# Patient Record
Sex: MALE | Race: WHITE | URBAN - METROPOLITAN AREA
[De-identification: names, ages, dates, MRNs, and addresses within clinical notes are randomized per-mention and may not be internally consistent; named-entity substitution may affect disease eponyms.]

---

## 2017-12-06 ENCOUNTER — OFFICE VISIT (OUTPATIENT)
Dept: URGENT CARE | Facility: URGENT CARE | Age: 27
End: 2017-12-06

## 2017-12-06 VITALS
WEIGHT: 238.3 LBS | SYSTOLIC BLOOD PRESSURE: 128 MMHG | DIASTOLIC BLOOD PRESSURE: 75 MMHG | OXYGEN SATURATION: 97 % | TEMPERATURE: 97 F | HEART RATE: 74 BPM

## 2017-12-06 DIAGNOSIS — T16.2XXA FOREIGN BODY OF LEFT EAR, INITIAL ENCOUNTER: Primary | ICD-10-CM

## 2017-12-06 PROCEDURE — 69200 CLEAR OUTER EAR CANAL: CPT | Performed by: FAMILY MEDICINE

## 2017-12-06 NOTE — NURSING NOTE
Chief Complaint   Patient presents with     Ear Problem     Ear bud from ear phone stuck on lt ear x noticed today        Initial /75  Pulse 74  Temp 97  F (36.1  C) (Oral)  Wt 238 lb 4.8 oz (108.1 kg)  SpO2 97% There is no height or weight on file to calculate BMI.  Medication Reconciliation: complete

## 2017-12-06 NOTE — MR AVS SNAPSHOT
"              After Visit Summary   2017    Julio Phlilip    MRN: [id number]           Patient Information     Date Of Birth          1990        Visit Information        Provider Department      2017 2:40 PM Francois Orellana,  [facility]        Today's Diagnoses     Foreign body of left ear, initial encounter    -  1       Follow-ups after your visit        Who to contact     If you have questions or need follow up information about today's clinic visit or your schedule please contact [facility] directly at [phone].  Normal or non-critical lab and imaging results will be communicated to you by KIXEYEhart, letter or phone within 4 business days after the clinic has received the results. If you do not hear from us within 7 days, please contact the clinic through KIXEYEhart or phone. If you have a critical or abnormal lab result, we will notify you by phone as soon as possible.  Submit refill requests through MyQuoteApp or call your pharmacy and they will forward the refill request to us. Please allow 3 business days for your refill to be completed.          Additional Information About Your Visit        MyChart Information     MyQuoteApp lets you send messages to your doctor, view your test results, renew your prescriptions, schedule appointments and more. To sign up, go to [email or web address] . Click on \"Log in\" on the left side of the screen, which will take you to the Welcome page. Then click on \"Sign up Now\" on the right side of the page.     You will be asked to enter the access code listed below, as well as some personal information. Please follow the directions to create your username and password.     Your access code is: [email or web address]  Expires: 3/6/2018  3:36 PM     Your access code will  in 90 days. If you need help or a new code, please call your [place] clinic or [phone].        Care EveryWhere ID     This is your Care " EveryWhere ID. This could be used by other organizations to access your [place] medical records  [id number]        Your Vitals Were     Pulse Temperature Pulse Oximetry             74 97  F (36.1  C) (Oral) 97%          Blood Pressure from Last 3 Encounters:   12/06/17 128/75    Weight from Last 3 Encounters:   12/06/17 238 lb 4.8 oz (108.1 kg)              We Performed the Following     REMOVE FB, EXT AUDITORY CANAL        Primary Care Provider Fax #    Physician No Ref-Primary [phone]       No address on file        Equal Access to Services     [facility] : Hadii aad ku hadasho Soomaali, waaxda luqadaha, qaybta kaalmada adeegyada, waxay idiin hayaan adeeg rodolfo melendez . So [facility] [phone].    ATENCIÓN: Si habla español, tiene a han disposición servicios gratuitos de asistencia lingüística. Llame al [phone].    We comply with applicable federal civil rights laws and [place] laws. We do not discriminate on the basis of race, color, national origin, age, disability, sex, sexual orientation, or gender identity.            Thank you!     Thank you for choosing [facility]  for your care. Our goal is always to provide you with excellent care. Hearing back from our patients is one way we can continue to improve our services. Please take a few minutes to complete the written survey that you may receive in the mail after your visit with us. Thank you!             Your Updated Medication List - Protect others around you: Learn how to safely use, store and throw away your medicines at www.disposemymeds.org.      Notice  As of 12/6/2017  3:36 PM    You have not been prescribed any medications.

## 2017-12-06 NOTE — PROGRESS NOTES
SUBJECTIVE: Julio Phillip is a 27 year old male presenting with a chief complaint of FB left ear.  Onset of symptoms was day(s) ago.  Course of illness is same.    Severity mild  Current and Associated symptoms: none3  Treatment measures tried include None tried.  Predisposing factors include None.    No past medical history on file.  No Known Allergies  Social History   Substance Use Topics     Smoking status: Never Smoker     Smokeless tobacco: Not on file     Alcohol use Not on file       ROS:  SKIN: no rash  GI: no vomiting    OBJECTIVE:  /75  Pulse 74  Temp 97  F (36.1  C) (Oral)  Wt 238 lb 4.8 oz (108.1 kg)  SpO2 97%GENERAL APPEARANCE: healthy, alert and no distress  HENT: TM's normal bilaterally, oral mucous membranes moist, no erythema noted and FB left ear, removed with pick ups  SKIN: no suspicious lesions or rashes      ICD-10-CM    1. Foreign body of left ear, initial encounter T16.2XXA REMOVE FB, EXT AUDITORY CANAL       Fluids/Rest, f/u if worse/not any better